# Patient Record
Sex: FEMALE | Race: WHITE | NOT HISPANIC OR LATINO
[De-identification: names, ages, dates, MRNs, and addresses within clinical notes are randomized per-mention and may not be internally consistent; named-entity substitution may affect disease eponyms.]

---

## 2017-03-20 ENCOUNTER — LABORATORY RESULT (OUTPATIENT)
Age: 62
End: 2017-03-20

## 2017-03-20 ENCOUNTER — APPOINTMENT (OUTPATIENT)
Dept: ENDOCRINOLOGY | Facility: CLINIC | Age: 62
End: 2017-03-20

## 2017-03-20 VITALS
OXYGEN SATURATION: 98 % | BODY MASS INDEX: 27 KG/M2 | WEIGHT: 164 LBS | HEART RATE: 64 BPM | HEIGHT: 65.5 IN | SYSTOLIC BLOOD PRESSURE: 120 MMHG | DIASTOLIC BLOOD PRESSURE: 80 MMHG

## 2017-03-21 LAB
25(OH)D3 SERPL-MCNC: 17.1 NG/ML
ALBUMIN SERPL ELPH-MCNC: 4.3 G/DL
ALP BLD-CCNC: 58 U/L
ALT SERPL-CCNC: 13 U/L
ANION GAP SERPL CALC-SCNC: 16 MMOL/L
AST SERPL-CCNC: 24 U/L
BILIRUB SERPL-MCNC: 0.2 MG/DL
BUN SERPL-MCNC: 30 MG/DL
CALCIUM SERPL-MCNC: 9.1 MG/DL
CALCIUM SERPL-MCNC: 9.1 MG/DL
CHLORIDE SERPL-SCNC: 107 MMOL/L
CO2 SERPL-SCNC: 22 MMOL/L
CREAT SERPL-MCNC: 0.68 MG/DL
GLUCOSE SERPL-MCNC: 79 MG/DL
PARATHYROID HORMONE INTACT: 24 PG/ML
POTASSIUM SERPL-SCNC: 4.4 MMOL/L
PROT SERPL-MCNC: 7 G/DL
SODIUM SERPL-SCNC: 145 MMOL/L
T3RU NFR SERPL: 1.14 INDEX
T4 SERPL-MCNC: 5 UG/DL
TSH SERPL-ACNC: 1.53 UIU/ML

## 2017-06-25 ENCOUNTER — EMERGENCY (EMERGENCY)
Facility: HOSPITAL | Age: 62
LOS: 1 days | Discharge: HOME | End: 2017-06-25
Admitting: EMERGENCY MEDICINE
Payer: COMMERCIAL

## 2017-06-25 PROCEDURE — 73030 X-RAY EXAM OF SHOULDER: CPT | Mod: 26,50

## 2017-06-25 PROCEDURE — 73200 CT UPPER EXTREMITY W/O DYE: CPT | Mod: 26,RT

## 2017-06-25 PROCEDURE — 99285 EMERGENCY DEPT VISIT HI MDM: CPT

## 2017-06-26 ENCOUNTER — OUTPATIENT (OUTPATIENT)
Dept: OUTPATIENT SERVICES | Facility: HOSPITAL | Age: 62
LOS: 1 days | End: 2017-06-26
Payer: COMMERCIAL

## 2017-06-26 ENCOUNTER — APPOINTMENT (OUTPATIENT)
Dept: CT IMAGING | Facility: CLINIC | Age: 62
End: 2017-06-26

## 2017-06-26 DIAGNOSIS — Z00.8 ENCOUNTER FOR OTHER GENERAL EXAMINATION: ICD-10-CM

## 2017-06-26 PROCEDURE — 96374 THER/PROPH/DIAG INJ IV PUSH: CPT

## 2017-06-26 PROCEDURE — 73200 CT UPPER EXTREMITY W/O DYE: CPT

## 2017-06-26 PROCEDURE — 76376 3D RENDER W/INTRP POSTPROCES: CPT

## 2017-06-26 PROCEDURE — 99284 EMERGENCY DEPT VISIT MOD MDM: CPT | Mod: 25

## 2017-06-26 PROCEDURE — 73030 X-RAY EXAM OF SHOULDER: CPT

## 2017-06-26 PROCEDURE — 96375 TX/PRO/DX INJ NEW DRUG ADDON: CPT

## 2017-06-29 ENCOUNTER — OUTPATIENT (OUTPATIENT)
Dept: OUTPATIENT SERVICES | Facility: HOSPITAL | Age: 62
LOS: 1 days | End: 2017-06-29
Payer: COMMERCIAL

## 2017-06-29 VITALS
OXYGEN SATURATION: 97 % | HEIGHT: 66 IN | RESPIRATION RATE: 20 BRPM | WEIGHT: 167.99 LBS | DIASTOLIC BLOOD PRESSURE: 92 MMHG | SYSTOLIC BLOOD PRESSURE: 150 MMHG | TEMPERATURE: 100 F | HEART RATE: 98 BPM

## 2017-06-29 DIAGNOSIS — I10 ESSENTIAL (PRIMARY) HYPERTENSION: ICD-10-CM

## 2017-06-29 DIAGNOSIS — S42.232A 3-PART FRACTURE OF SURGICAL NECK OF LEFT HUMERUS, INITIAL ENCOUNTER FOR CLOSED FRACTURE: ICD-10-CM

## 2017-06-29 DIAGNOSIS — S42.224A 2-PART NONDISPLACED FRACTURE OF SURGICAL NECK OF RIGHT HUMERUS, INITIAL ENCOUNTER FOR CLOSED FRACTURE: ICD-10-CM

## 2017-06-29 DIAGNOSIS — Z98.890 OTHER SPECIFIED POSTPROCEDURAL STATES: Chronic | ICD-10-CM

## 2017-06-29 DIAGNOSIS — Z01.818 ENCOUNTER FOR OTHER PREPROCEDURAL EXAMINATION: ICD-10-CM

## 2017-06-29 DIAGNOSIS — I49.9 CARDIAC ARRHYTHMIA, UNSPECIFIED: ICD-10-CM

## 2017-06-29 DIAGNOSIS — F41.9 ANXIETY DISORDER, UNSPECIFIED: ICD-10-CM

## 2017-06-29 DIAGNOSIS — Z91.09 OTHER ALLERGY STATUS, OTHER THAN TO DRUGS AND BIOLOGICAL SUBSTANCES: ICD-10-CM

## 2017-06-29 LAB
BLD GP AB SCN SERPL QL: NEGATIVE — SIGNIFICANT CHANGE UP
RH IG SCN BLD-IMP: POSITIVE — SIGNIFICANT CHANGE UP

## 2017-06-29 PROCEDURE — G0463: CPT

## 2017-06-29 PROCEDURE — 80048 BASIC METABOLIC PNL TOTAL CA: CPT

## 2017-06-29 PROCEDURE — 85027 COMPLETE CBC AUTOMATED: CPT

## 2017-06-29 PROCEDURE — 86901 BLOOD TYPING SEROLOGIC RH(D): CPT

## 2017-06-29 PROCEDURE — 86900 BLOOD TYPING SEROLOGIC ABO: CPT

## 2017-06-29 PROCEDURE — 86850 RBC ANTIBODY SCREEN: CPT

## 2017-06-29 RX ORDER — CEFAZOLIN SODIUM 1 G
2000 VIAL (EA) INJECTION ONCE
Qty: 0 | Refills: 0 | Status: DISCONTINUED | OUTPATIENT
Start: 2017-06-30 | End: 2017-07-02

## 2017-06-29 NOTE — H&P PST ADULT - HISTORY OF PRESENT ILLNESS
57 year old female with Mitral Valve Prolapse, HTN, Asthma, Migraine, Thyroid disorder. Pt had a mechanical fall on 6/25/2017 sustained B/L neck of humerus fracture s/p Er visit  -applied b/ splint .Now coming in PST for scheduled  Open Reduction Internal Fixation Of Left Proximal Humerus Surgical neck fracture .Examination Under Anesthesia of Right shoulder Possible Open Reduction Internal fixation of right Proximal Humerus  Surgical neck fracture on 6/30/2017. 57 year old female with Mitral Valve Prolapse, HTN, Asthma, Migraine, Thyroid disorder. Pt had a mechanical fall on 6/25/2017 sustained B/L neck of humerus fracture s/p Er visit  -applied b/ splint .Now coming in PST for scheduled  Open Reduction Internal Fixation Of Left Proximal Humerus Surgical neck fracture .Examination Under Anesthesia of Right shoulder Possible Open Reduction Internal fixation of right Proximal Humerus  Surgical neck fracture on 6/30/2017.  Pt has h/o  allergy reaction with metal as per patient Dr Mcdonald aware. 57 year old female with Mitral Valve Prolapse, HTN, Asthma, Migraine, Thyroid disorder. Pt had a mechanical fall on 6/25/2017 sustained B/L neck of humerus fracture s/p Er visit  -applied b/ splint .Now coming in PST for scheduled  Open Reduction Internal Fixation Of Left Proximal Humerus Surgical neck fracture .Examination Under Anesthesia of Right shoulder Possible Open Reduction Internal fixation of right Proximal Humerus  Surgical neck fracture on 6/30/2017.pt is very uncomfortable secondary to pain.  Pt has h/o  allergy reaction with metal as per patient Dr Mcdonald aware. 57 year old female with Mitral Valve Prolapse, HTN, Asthma, Migraine, Thyroid disorder. Pt had a mechanical fall on 6/25/2017 sustained B/L neck of humerus fracture s/p Er visit  -applied b/ splint .Now coming in PST for scheduled  Open Reduction Internal Fixation Of Left Proximal Humerus Surgical neck fracture .Examination Under Anesthesia of Right shoulder Possible Open Reduction Internal fixation of right Proximal Humerus  Surgical neck fracture on 6/30/2017.pt is very uncomfortable secondary to pain.  Pt has h/o  allergy reaction with metal as per patient Dr Tamara gonzalez, cardiology eval on file. 57 year old female with Mitral Valve Prolapse, HTN, Asthma, Migraine, Thyroid disorder. Pt had a mechanical fall on 6/25/2017 sustained B/L neck of humerus fracture s/p Er visit -  applied b/L ar sling sling .Now coming in PST for scheduled  Open Reduction Internal Fixation Of Left Proximal Humerus Surgical neck fracture .Examination Under Anesthesia of Right shoulder Possible Open Reduction Internal fixation of right Proximal Humerus  Surgical neck fracture on 6/30/2017.pt is very uncomfortable secondary to pain.  Pt has h/o  allergy reaction with metal as per patient Dr Tamara gonzalez, cardiology eval on file.

## 2017-06-29 NOTE — H&P PST ADULT - PRO PAIN LIFE ADAPT
inability to concentrate/inability or reluctance to perform ADLs/inability to sleep/decreased activity level

## 2017-06-29 NOTE — H&P PST ADULT - NSANTHOSAYNRD_GEN_A_CORE
No. JULIA screening performed.  STOP BANG Legend: 0-2 = LOW Risk; 3-4 = INTERMEDIATE Risk; 5-8 = HIGH Risk

## 2017-06-29 NOTE — H&P PST ADULT - PROBLEM SELECTOR PLAN 2
.Examination Under Anesthesia of Right shoulder Possible Open Reduction Internal fixation of right Proximal Humerus  Surgical neck fracture on 6/30/2017.

## 2017-06-29 NOTE — H&P PST ADULT - PSH
H/O arthroscopic knee surgery  right  (1993)  History of D&C    S/P laparoscopy  endometriosis  S/P tonsillectomy and adenoidectomy  1961

## 2017-06-29 NOTE — H&P PST ADULT - RS GEN PE MLT RESP DETAILS PC
breath sounds equal/respirations non-labored/normal/good air movement/no chest wall tenderness/airway patent/clear to auscultation bilaterally

## 2017-06-29 NOTE — H&P PST ADULT - PROBLEM SELECTOR PROBLEM 2
2-part nondisplaced fracture of surgical neck of right humerus, initial encounter for closed fracture

## 2017-06-29 NOTE — H&P PST ADULT - PROBLEM SELECTOR PLAN 1
Open Reduction Internal Fixation Of Left Proximal Humerus Surgical neck fracture  Pre Op instructions discussed Open Reduction Internal Fixation Of Left Proximal Humerus Surgical neck fracture  Pre Op instructions discussed  Labs CBC,BMP Type and screen sent

## 2017-06-29 NOTE — H&P PST ADULT - LYMPHATIC
supraclavicular L/anterior cervical L/supraclavicular R/anterior cervical R/posterior cervical L/posterior cervical R

## 2017-06-30 ENCOUNTER — INPATIENT (INPATIENT)
Facility: HOSPITAL | Age: 62
LOS: 1 days | Discharge: HOME | DRG: 493 | End: 2017-07-02
Attending: ORTHOPAEDIC SURGERY | Admitting: ORTHOPAEDIC SURGERY
Payer: COMMERCIAL

## 2017-06-30 VITALS
OXYGEN SATURATION: 97 % | DIASTOLIC BLOOD PRESSURE: 90 MMHG | TEMPERATURE: 98 F | RESPIRATION RATE: 210 BRPM | SYSTOLIC BLOOD PRESSURE: 162 MMHG | HEART RATE: 92 BPM

## 2017-06-30 DIAGNOSIS — S42.232A 3-PART FRACTURE OF SURGICAL NECK OF LEFT HUMERUS, INITIAL ENCOUNTER FOR CLOSED FRACTURE: ICD-10-CM

## 2017-06-30 DIAGNOSIS — S42.224A 2-PART NONDISPLACED FRACTURE OF SURGICAL NECK OF RIGHT HUMERUS, INITIAL ENCOUNTER FOR CLOSED FRACTURE: ICD-10-CM

## 2017-06-30 DIAGNOSIS — Z98.890 OTHER SPECIFIED POSTPROCEDURAL STATES: Chronic | ICD-10-CM

## 2017-06-30 DIAGNOSIS — Z01.818 ENCOUNTER FOR OTHER PREPROCEDURAL EXAMINATION: ICD-10-CM

## 2017-06-30 LAB
ANION GAP SERPL CALC-SCNC: 18 MMOL/L — HIGH (ref 5–17)
BUN SERPL-MCNC: 18 MG/DL — SIGNIFICANT CHANGE UP (ref 7–23)
CALCIUM SERPL-MCNC: 9.5 MG/DL — SIGNIFICANT CHANGE UP (ref 8.4–10.5)
CHLORIDE SERPL-SCNC: 105 MMOL/L — SIGNIFICANT CHANGE UP (ref 96–108)
CO2 SERPL-SCNC: 19 MMOL/L — LOW (ref 22–31)
CREAT SERPL-MCNC: 0.67 MG/DL — SIGNIFICANT CHANGE UP (ref 0.5–1.3)
GLUCOSE SERPL-MCNC: 107 MG/DL — HIGH (ref 70–99)
HCT VFR BLD CALC: 31.5 % — LOW (ref 34.5–45)
HGB BLD-MCNC: 9.7 G/DL — LOW (ref 11.5–15.5)
MCHC RBC-ENTMCNC: 26.6 PG — LOW (ref 27–34)
MCHC RBC-ENTMCNC: 30.8 GM/DL — LOW (ref 32–36)
MCV RBC AUTO: 86.5 FL — SIGNIFICANT CHANGE UP (ref 80–100)
PLATELET # BLD AUTO: 347 K/UL — SIGNIFICANT CHANGE UP (ref 150–400)
POTASSIUM SERPL-MCNC: 4.5 MMOL/L — SIGNIFICANT CHANGE UP (ref 3.5–5.3)
POTASSIUM SERPL-SCNC: 4.5 MMOL/L — SIGNIFICANT CHANGE UP (ref 3.5–5.3)
RBC # BLD: 3.64 M/UL — LOW (ref 3.8–5.2)
RBC # FLD: 14.6 % — HIGH (ref 10.3–14.5)
RH IG SCN BLD-IMP: POSITIVE — SIGNIFICANT CHANGE UP
SODIUM SERPL-SCNC: 142 MMOL/L — SIGNIFICANT CHANGE UP (ref 135–145)
WBC # BLD: 11.7 K/UL — HIGH (ref 3.8–10.5)
WBC # FLD AUTO: 11.7 K/UL — HIGH (ref 3.8–10.5)

## 2017-06-30 PROCEDURE — 73030 X-RAY EXAM OF SHOULDER: CPT | Mod: 26,LT

## 2017-06-30 RX ORDER — LIDOCAINE HCL 20 MG/ML
0.2 VIAL (ML) INJECTION ONCE
Qty: 0 | Refills: 0 | Status: DISCONTINUED | OUTPATIENT
Start: 2017-06-30 | End: 2017-06-30

## 2017-06-30 RX ORDER — TOPIRAMATE 25 MG
1 TABLET ORAL
Qty: 0 | Refills: 0 | COMMUNITY

## 2017-06-30 RX ORDER — ESCITALOPRAM OXALATE 10 MG/1
1 TABLET, FILM COATED ORAL
Qty: 0 | Refills: 0 | COMMUNITY

## 2017-06-30 RX ORDER — TOPIRAMATE 25 MG
3 TABLET ORAL
Qty: 0 | Refills: 0 | COMMUNITY

## 2017-06-30 RX ORDER — METOPROLOL TARTRATE 50 MG
1 TABLET ORAL
Qty: 0 | Refills: 0 | COMMUNITY

## 2017-06-30 RX ORDER — ACETAMINOPHEN 500 MG
650 TABLET ORAL EVERY 6 HOURS
Qty: 0 | Refills: 0 | Status: DISCONTINUED | OUTPATIENT
Start: 2017-06-30 | End: 2017-07-02

## 2017-06-30 RX ORDER — LOSARTAN POTASSIUM 100 MG/1
50 TABLET, FILM COATED ORAL DAILY
Qty: 0 | Refills: 0 | Status: DISCONTINUED | OUTPATIENT
Start: 2017-06-30 | End: 2017-07-02

## 2017-06-30 RX ORDER — LORATADINE 10 MG/1
1 TABLET ORAL
Qty: 0 | Refills: 0 | COMMUNITY

## 2017-06-30 RX ORDER — TOPIRAMATE 25 MG
50 TABLET ORAL AT BEDTIME
Qty: 0 | Refills: 0 | Status: DISCONTINUED | OUTPATIENT
Start: 2017-06-30 | End: 2017-07-02

## 2017-06-30 RX ORDER — ONDANSETRON 8 MG/1
4 TABLET, FILM COATED ORAL EVERY 6 HOURS
Qty: 0 | Refills: 0 | Status: DISCONTINUED | OUTPATIENT
Start: 2017-06-30 | End: 2017-07-02

## 2017-06-30 RX ORDER — FAMOTIDINE 10 MG/ML
20 INJECTION INTRAVENOUS EVERY 12 HOURS
Qty: 0 | Refills: 0 | Status: DISCONTINUED | OUTPATIENT
Start: 2017-06-30 | End: 2017-07-02

## 2017-06-30 RX ORDER — LORATADINE 10 MG/1
10 TABLET ORAL DAILY
Qty: 0 | Refills: 0 | Status: DISCONTINUED | OUTPATIENT
Start: 2017-06-30 | End: 2017-07-02

## 2017-06-30 RX ORDER — HYDROMORPHONE HYDROCHLORIDE 2 MG/ML
0.5 INJECTION INTRAMUSCULAR; INTRAVENOUS; SUBCUTANEOUS
Qty: 0 | Refills: 0 | Status: DISCONTINUED | OUTPATIENT
Start: 2017-06-30 | End: 2017-06-30

## 2017-06-30 RX ORDER — DIPHENHYDRAMINE HCL 50 MG
25 CAPSULE ORAL AT BEDTIME
Qty: 0 | Refills: 0 | Status: DISCONTINUED | OUTPATIENT
Start: 2017-06-30 | End: 2017-07-02

## 2017-06-30 RX ORDER — ESCITALOPRAM OXALATE 10 MG/1
10 TABLET, FILM COATED ORAL DAILY
Qty: 0 | Refills: 0 | Status: DISCONTINUED | OUTPATIENT
Start: 2017-06-30 | End: 2017-07-02

## 2017-06-30 RX ORDER — OXYCODONE HYDROCHLORIDE 5 MG/1
10 TABLET ORAL EVERY 4 HOURS
Qty: 0 | Refills: 0 | Status: DISCONTINUED | OUTPATIENT
Start: 2017-06-30 | End: 2017-07-02

## 2017-06-30 RX ORDER — HEPARIN SODIUM 5000 [USP'U]/ML
5000 INJECTION INTRAVENOUS; SUBCUTANEOUS EVERY 8 HOURS
Qty: 0 | Refills: 0 | Status: DISCONTINUED | OUTPATIENT
Start: 2017-06-30 | End: 2017-07-02

## 2017-06-30 RX ORDER — SODIUM CHLORIDE 9 MG/ML
1000 INJECTION, SOLUTION INTRAVENOUS
Qty: 0 | Refills: 0 | Status: DISCONTINUED | OUTPATIENT
Start: 2017-06-30 | End: 2017-07-02

## 2017-06-30 RX ORDER — OXYCODONE HYDROCHLORIDE 5 MG/1
5 TABLET ORAL EVERY 4 HOURS
Qty: 0 | Refills: 0 | Status: DISCONTINUED | OUTPATIENT
Start: 2017-06-30 | End: 2017-07-02

## 2017-06-30 RX ORDER — LOSARTAN POTASSIUM 100 MG/1
1 TABLET, FILM COATED ORAL
Qty: 0 | Refills: 0 | COMMUNITY

## 2017-06-30 RX ORDER — CEFAZOLIN SODIUM 1 G
2000 VIAL (EA) INJECTION EVERY 8 HOURS
Qty: 0 | Refills: 0 | Status: COMPLETED | OUTPATIENT
Start: 2017-06-30 | End: 2017-07-01

## 2017-06-30 RX ORDER — ALBUTEROL 90 UG/1
2 AEROSOL, METERED ORAL EVERY 6 HOURS
Qty: 0 | Refills: 0 | Status: DISCONTINUED | OUTPATIENT
Start: 2017-06-30 | End: 2017-07-02

## 2017-06-30 RX ORDER — ASCORBIC ACID 60 MG
500 TABLET,CHEWABLE ORAL
Qty: 0 | Refills: 0 | Status: DISCONTINUED | OUTPATIENT
Start: 2017-06-30 | End: 2017-07-02

## 2017-06-30 RX ORDER — DOCUSATE SODIUM 100 MG
100 CAPSULE ORAL THREE TIMES A DAY
Qty: 0 | Refills: 0 | Status: DISCONTINUED | OUTPATIENT
Start: 2017-06-30 | End: 2017-07-02

## 2017-06-30 RX ORDER — MORPHINE SULFATE 50 MG/1
2 CAPSULE, EXTENDED RELEASE ORAL EVERY 4 HOURS
Qty: 0 | Refills: 0 | Status: DISCONTINUED | OUTPATIENT
Start: 2017-06-30 | End: 2017-07-02

## 2017-06-30 RX ORDER — ALBUTEROL 90 UG/1
2 AEROSOL, METERED ORAL
Qty: 0 | Refills: 0 | COMMUNITY

## 2017-06-30 RX ORDER — SODIUM CHLORIDE 9 MG/ML
3 INJECTION INTRAMUSCULAR; INTRAVENOUS; SUBCUTANEOUS EVERY 8 HOURS
Qty: 0 | Refills: 0 | Status: DISCONTINUED | OUTPATIENT
Start: 2017-06-30 | End: 2017-06-30

## 2017-06-30 RX ORDER — TOPIRAMATE 25 MG
75 TABLET ORAL DAILY
Qty: 0 | Refills: 0 | Status: DISCONTINUED | OUTPATIENT
Start: 2017-06-30 | End: 2017-07-02

## 2017-06-30 RX ORDER — METOPROLOL TARTRATE 50 MG
25 TABLET ORAL DAILY
Qty: 0 | Refills: 0 | Status: DISCONTINUED | OUTPATIENT
Start: 2017-06-30 | End: 2017-07-02

## 2017-06-30 RX ADMIN — Medication 650 MILLIGRAM(S): at 16:22

## 2017-06-30 RX ADMIN — MORPHINE SULFATE 2 MILLIGRAM(S): 50 CAPSULE, EXTENDED RELEASE ORAL at 23:30

## 2017-06-30 RX ADMIN — Medication 650 MILLIGRAM(S): at 16:52

## 2017-06-30 RX ADMIN — Medication 100 MILLIGRAM(S): at 20:32

## 2017-06-30 RX ADMIN — Medication 100 MILLIGRAM(S): at 17:10

## 2017-06-30 RX ADMIN — SODIUM CHLORIDE 3 MILLILITER(S): 9 INJECTION INTRAMUSCULAR; INTRAVENOUS; SUBCUTANEOUS at 06:57

## 2017-06-30 RX ADMIN — FAMOTIDINE 20 MILLIGRAM(S): 10 INJECTION INTRAVENOUS at 17:16

## 2017-06-30 RX ADMIN — HEPARIN SODIUM 5000 UNIT(S): 5000 INJECTION INTRAVENOUS; SUBCUTANEOUS at 22:11

## 2017-06-30 RX ADMIN — OXYCODONE HYDROCHLORIDE 5 MILLIGRAM(S): 5 TABLET ORAL at 16:22

## 2017-06-30 RX ADMIN — Medication 25 MILLIGRAM(S): at 22:13

## 2017-06-30 RX ADMIN — Medication 100 MILLIGRAM(S): at 22:11

## 2017-06-30 RX ADMIN — OXYCODONE HYDROCHLORIDE 10 MILLIGRAM(S): 5 TABLET ORAL at 20:36

## 2017-06-30 RX ADMIN — SODIUM CHLORIDE 75 MILLILITER(S): 9 INJECTION, SOLUTION INTRAVENOUS at 15:46

## 2017-06-30 RX ADMIN — Medication 500 MILLIGRAM(S): at 17:16

## 2017-06-30 RX ADMIN — Medication 50 MILLIGRAM(S): at 22:11

## 2017-06-30 RX ADMIN — OXYCODONE HYDROCHLORIDE 5 MILLIGRAM(S): 5 TABLET ORAL at 16:52

## 2017-06-30 RX ADMIN — OXYCODONE HYDROCHLORIDE 10 MILLIGRAM(S): 5 TABLET ORAL at 21:30

## 2017-06-30 NOTE — CHART NOTE - NSCHARTNOTEFT_GEN_A_CORE
Resting without complaints.  No Chest Pain, SOB, N/V.    T(C): 36.9 (06-30-17 @ 13:15), Max: 37.7 (06-29-17 @ 16:38)  HR: 101 (06-30-17 @ 14:30) (92 - 104)  BP: 116/58 (06-30-17 @ 14:30) (108/57 - 162/90)  RR: 18 (06-30-17 @ 14:30) (18 - 210)  SpO2: 96% (06-30-17 @ 14:30) (96% - 100%)  Wt(kg): --    Exam:  Alert and [place], No Acute Distress  Card: +S1/S2, RRR  Pulm: CTAB  Bilateral Upper Extremities in Slings.  Compartments soft/compressible.  + Radial Pulses bilat  LUE dsg c/d/i.  Limited exam 2/2 block but digits warm and slightly mobile, unable to assess sensation  RUE digits pink/warm/mobile with good wrist mobility. SILT  Calves S/NT bilat.               AM Labs      A/P: S/p ORIF Left Prox humerus fracture with non-op treatment of R proximal humerus fracture.  S/p interscalene block.  VSS. NAD  -re-eval when block d/c'd.   - AM labs  -PT/OT- NWB bilat upper extremities, may remove sling LUE, sling at all times RUE, no ROM of shoulders.  -IS  -DVT PPx  -Pain Control  -Continue Current Tx

## 2017-07-01 PROCEDURE — 93010 ELECTROCARDIOGRAM REPORT: CPT

## 2017-07-01 RX ORDER — DOCUSATE SODIUM 100 MG
1 CAPSULE ORAL
Qty: 0 | Refills: 0 | COMMUNITY
Start: 2017-07-01

## 2017-07-01 RX ORDER — FAMOTIDINE 10 MG/ML
1 INJECTION INTRAVENOUS
Qty: 0 | Refills: 0 | COMMUNITY
Start: 2017-07-01

## 2017-07-01 RX ORDER — ACETAMINOPHEN 500 MG
2 TABLET ORAL
Qty: 0 | Refills: 0 | COMMUNITY
Start: 2017-07-01

## 2017-07-01 RX ORDER — ASCORBIC ACID 60 MG
1 TABLET,CHEWABLE ORAL
Qty: 0 | Refills: 0 | COMMUNITY
Start: 2017-07-01

## 2017-07-01 RX ORDER — OXYCODONE HYDROCHLORIDE 5 MG/1
1 TABLET ORAL
Qty: 0 | Refills: 0 | COMMUNITY
Start: 2017-07-01

## 2017-07-01 RX ORDER — ACETAMINOPHEN 500 MG
1000 TABLET ORAL ONCE
Qty: 0 | Refills: 0 | Status: COMPLETED | OUTPATIENT
Start: 2017-07-01 | End: 2017-07-01

## 2017-07-01 RX ADMIN — Medication 100 MILLIGRAM(S): at 06:25

## 2017-07-01 RX ADMIN — ESCITALOPRAM OXALATE 10 MILLIGRAM(S): 10 TABLET, FILM COATED ORAL at 11:35

## 2017-07-01 RX ADMIN — Medication 400 MILLIGRAM(S): at 00:57

## 2017-07-01 RX ADMIN — Medication 500 MILLIGRAM(S): at 06:25

## 2017-07-01 RX ADMIN — OXYCODONE HYDROCHLORIDE 10 MILLIGRAM(S): 5 TABLET ORAL at 00:53

## 2017-07-01 RX ADMIN — OXYCODONE HYDROCHLORIDE 10 MILLIGRAM(S): 5 TABLET ORAL at 07:12

## 2017-07-01 RX ADMIN — HEPARIN SODIUM 5000 UNIT(S): 5000 INJECTION INTRAVENOUS; SUBCUTANEOUS at 06:25

## 2017-07-01 RX ADMIN — Medication 1 TABLET(S): at 11:39

## 2017-07-01 RX ADMIN — LOSARTAN POTASSIUM 50 MILLIGRAM(S): 100 TABLET, FILM COATED ORAL at 06:25

## 2017-07-01 RX ADMIN — Medication 650 MILLIGRAM(S): at 20:56

## 2017-07-01 RX ADMIN — FAMOTIDINE 20 MILLIGRAM(S): 10 INJECTION INTRAVENOUS at 17:28

## 2017-07-01 RX ADMIN — Medication 650 MILLIGRAM(S): at 11:44

## 2017-07-01 RX ADMIN — HEPARIN SODIUM 5000 UNIT(S): 5000 INJECTION INTRAVENOUS; SUBCUTANEOUS at 13:12

## 2017-07-01 RX ADMIN — OXYCODONE HYDROCHLORIDE 10 MILLIGRAM(S): 5 TABLET ORAL at 02:00

## 2017-07-01 RX ADMIN — FAMOTIDINE 20 MILLIGRAM(S): 10 INJECTION INTRAVENOUS at 06:25

## 2017-07-01 RX ADMIN — Medication 650 MILLIGRAM(S): at 21:56

## 2017-07-01 RX ADMIN — HEPARIN SODIUM 5000 UNIT(S): 5000 INJECTION INTRAVENOUS; SUBCUTANEOUS at 21:03

## 2017-07-01 RX ADMIN — Medication 650 MILLIGRAM(S): at 12:06

## 2017-07-01 RX ADMIN — Medication 25 MILLIGRAM(S): at 21:03

## 2017-07-01 RX ADMIN — Medication 500 MILLIGRAM(S): at 17:28

## 2017-07-01 RX ADMIN — Medication 100 MILLIGRAM(S): at 13:12

## 2017-07-01 RX ADMIN — Medication 100 MILLIGRAM(S): at 21:02

## 2017-07-01 RX ADMIN — MORPHINE SULFATE 2 MILLIGRAM(S): 50 CAPSULE, EXTENDED RELEASE ORAL at 00:42

## 2017-07-01 RX ADMIN — OXYCODONE HYDROCHLORIDE 10 MILLIGRAM(S): 5 TABLET ORAL at 05:18

## 2017-07-01 RX ADMIN — Medication 100 MILLIGRAM(S): at 06:24

## 2017-07-01 RX ADMIN — Medication 75 MILLIGRAM(S): at 11:39

## 2017-07-01 RX ADMIN — Medication 50 MILLIGRAM(S): at 21:02

## 2017-07-01 NOTE — PHYSICAL THERAPY INITIAL EVALUATION ADULT - PERTINENT HX OF CURRENT PROBLEM, REHAB EVAL
Pt is a 57 year old female admitted to [facility] on 6/30/17 s/p mechanical fall 6/25/17 sustained b/l neck of humerus fx. Went to ED, b/l sling UE, Now scheduled for PST ORIF . Pt with hx of allergy with metal. Pt with hx MVP, HTN, asthma, migraine, thyroid disorder

## 2017-07-01 NOTE — DISCHARGE NOTE ADULT - CARE PROVIDER_API CALL
Ovi Mcdonald (MD), Orthopaedic Surgery  [address] [place] [address]  Phone: [phone]  Fax: [phone]

## 2017-07-01 NOTE — PHYSICAL THERAPY INITIAL EVALUATION ADULT - DISCHARGE DISPOSITION, PT EVAL
outpatient PT/DC home with outpatient PT services per MD discretion, assist from family for mobility/ADLs, CM to be notified, DC plan to be emailed.

## 2017-07-01 NOTE — PROGRESS NOTE ADULT - ASSESSMENT
S/P  ORIF LT PROX HUMERUS FX          Non-op RT PROX HUMERUS FX    Plan:         OT/PT        CK LABS       NO ROM OF SHOULDERS       Meghan Pickering PA-C   Beeper    [id number]

## 2017-07-01 NOTE — DISCHARGE NOTE ADULT - ADDITIONAL INSTRUCTIONS
- Follow up with Dr. Mcdonald in 10-14 days after surgery; call office for appointment upon discharge  - - Please have staples/sutures removed by physician 10-14 days after surgery if applicable  - - please follow up with your primary care doctor after discharge from hospital to discuss your hospital stay and any changes to you medications

## 2017-07-01 NOTE — DISCHARGE NOTE ADULT - PLAN OF CARE
non-op    DIET: resume regular diet regimen   DVT PROPHYLAXIS: ecotrin 325mg once a day x 6 weeks  WEIGHT-BEARING STATUS: - NON-weight bearing bilateral arms in slings  - NO range of motion of bilateral shoulders (range of motion at elbow/wrist/hand/fingers OK)  BATHING: keep dressing clean and dry   DRESSING CHANGES: every 2 days until dressings are dry open reduction internal fixation left humerus    DIET: resume regular diet regimen   DVT PROPHYLAXIS: ecotrin 325mg once a day x 6 weeks  WEIGHT-BEARING STATUS: - NON-weight bearing bilateral arms in slings  - NO range of motion of bilateral shoulders (range of motion at elbow/wrist/hand/fingers OK)  BATHING: keep dressing clean and dry   DRESSING CHANGES: every 2 days until dressings are dry pain free activities of daily living NON-OP    DIET: resume regular diet regimen   DVT PROPHYLAXIS: ecotrin 325mg once a day x 6 weeks  WEIGHT-BEARING STATUS: - NON-weight bearing bilateral arms in slings; both arms are OK to be taken out of slings for range of motion exercises  Right arm (NON-OP): NO shoulder range of motion; active range of motion at elbow/wrist/fingers/hand as tolerated OK  Left arm: active range of motion at shoulder/elbow/wrist/fingers/hand as tolerated OK    BATHING: keep dressing clean and dry   DRESSING CHANGES: every 2 days until dressings are dry open reduction internal fixation left humerus    DIET: resume regular diet regimen   DVT PROPHYLAXIS: ecotrin 325mg once a day x 6 weeks  WEIGHT-BEARING STATUS: - NON-weight bearing bilateral arms in slings; both arms are OK to be taken out of slings for range of motion exercises  Right arm (NON-OP): NO shoulder range of motion; active range of motion at elbow/wrist/fingers/hand as tolerated OK  Left arm: active range of motion at shoulder/elbow/wrist/fingers/hand as tolerated OK    BATHING: keep dressing clean and dry   DRESSING CHANGES: every 2 days until dressings are dry    DIET: resume regular diet regimen   DVT PROPHYLAXIS: ecotrin 325mg once a day x 6 weeks  WEIGHT-BEARING STATUS: - NON-weight bearing bilateral arms in slings  - NO range of motion of bilateral shoulders (range of motion at elbow/wrist/hand/fingers OK)  BATHING: keep dressing clean and dry   DRESSING CHANGES: every 2 days until dressings are dry

## 2017-07-01 NOTE — DISCHARGE NOTE ADULT - NS AS ACTIVITY OBS
Do not drive or operate machinery/Walking-Outdoors allowed/No Heavy lifting/straining/Stairs allowed/Walking-Indoors allowed/Do not make important decisions

## 2017-07-01 NOTE — PHYSICAL THERAPY INITIAL EVALUATION ADULT - ADDITIONAL COMMENTS
Pt lives at home with spouse, +3 steps to etner with landings ( 1 step landing, 1 step then landing, etc), PTA ind amb and ADLs; spouse with hx TBI, dtr is home and able to assist as needed (staying with parents for the week)

## 2017-07-01 NOTE — DISCHARGE NOTE ADULT - HOSPITAL COURSE
· Primary Care Provider	Dr Iam Suero   · Care Providers for Follow up (PCP/Outpatient Provider)	Randy Canada  cardiology    [phone]    Chief Complaint/Reason for Visit/HPI:   Chief Complaint/Reason for Visit:  Chief Complaint/Reason for Admission	ORIF left and right Humerus    History of Present Illness:  History of Present Illness	  57 year old female with Mitral Valve Prolapse, HTN, Asthma, Migraine, Thyroid disorder. Pt had a mechanical fall on 2017 sustained B/L neck of humerus fracture s/p Er visit -  applied b/L ar sling sling .Now coming in PST for scheduled  Open Reduction Internal Fixation Of Left Proximal Humerus Surgical neck fracture .Examination Under Anesthesia of Right shoulder Possible Open Reduction Internal fixation of right Proximal Humerus  Surgical neck fracture on 2017.pt is very uncomfortable secondary to pain.  Pt has h/o  allergy reaction with metal as per patient Dr Tamara gonzalez, cardiology eval on file.    Allergies/Medications:   Allergies:        Allergies:  	Altace: Drug, Short breath, wheeze  	sulfa drugs: Drug Category, Anaphylaxis  	fish: Food, Anaphylaxis  	bees: Miscellaneous, Anaphylaxis, bees  	metals: Miscellaneous, Rash, metals  	Effexor: Drug, Chills, Vomiting    Home Medications:   * Patient Currently Takes Medications as of 2017 17:02 documented in Prescription Writer  · 	Toprol-XL 25 mg oral tablet, extended release: Last Dose Taken:  , 1 tab(s) orally once a day (at bedtime)  · 	Cozaar 50 mg oral tablet: Last Dose Taken:  , 1 tab(s) orally once a day  · 	ProAir HFA 90 mcg/inh inhalation aerosol: Last Dose Taken:  , 2 puff(s) inhaled 4 times a day, As Needed  · 	Lexapro 10 mg oral tablet: Last Dose Taken:  , 1 tab(s) orally once a day  · 	Topamax 50 mg oral tablet: Last Dose Taken:  , 1 tab(s) orally once a day (at bedtime)  · 	Topamax 25 mg oral tablet: Last Dose Taken:  , 3 tab(s) orally once a day In am ( 75 Mg)  · 	oxycodone-acetaminophen 5mg-325mg oral tablet: Last Dose Taken:  , 1 tab(s) orally every 6 hours, As Needed  · 	Claritin 24 Hour Allergy 10 mg oral tablet: Last Dose Taken:  , 1 tab(s) orally once a day    PMH/PSH/FH/SH:   Past Medical History:  2-part nondisplaced fracture of surgical neck of right humerus, initial encounter for closed fracture  s/p mechanical fall  3-part fracture of surgical neck of left humerus, initial encounter    Anxiety    Arrhythmia  f/u with cardiology  Asthma  last attack  ( has never  been hospitalized, controlled )  Endometriosis  Dilatation and Curettage ( polyps removal )  Hemorrhage complicating pregnancy, greater than 22 weeks, antepartum  due to close child birth  Hemorrhoids    HTN (hypertension)    Migraine with vertigo    Mitral valve prolapse    Neck injury  C3-4  area in  ( fell down a flight of stairs at [facility] )   delivery   labour twice, uterine wall unable to contract ( massive blood loss).  Thyroid disorder  s/p radio active  tetratment ()  Vertigo  ( Severe ) where hospital for it in [facility] 2 years ago. Last year slight vertigo 2012. Was evaluated for Stroke,  Multiple Sclerosis, No Brain tumor ) and was cleared.    Past Surgical History:  H/O arthroscopic knee surgery  right  ()  History of D&C    S/P laparoscopy  endometriosis  S/P tonsillectomy and adenoidectomy  .    Hospital Course:  : Admitted to [facility]; with bilateral proximal humerus fractures; underwent left proximal humerus open reduction internal fixation; right humerus was non-op'ed; tolerated procedure well   : evaluated by physical therapy/occupational therapy who recommended: home  Pt stable for discharge on:  Discharge H/H: · Primary Care Provider	Dr Iam Suero   · Care Providers for Follow up (PCP/Outpatient Provider)	Randy Canada  cardiology    [phone]    Chief Complaint/Reason for Visit/HPI:   Chief Complaint/Reason for Visit:  Chief Complaint/Reason for Admission	ORIF left and right Humerus    History of Present Illness:  History of Present Illness	  57 year old female with Mitral Valve Prolapse, HTN, Asthma, Migraine, Thyroid disorder. Pt had a mechanical fall on 2017 sustained B/L neck of humerus fracture s/p Er visit -  applied b/L ar sling sling .Now coming in PST for scheduled  Open Reduction Internal Fixation Of Left Proximal Humerus Surgical neck fracture .Examination Under Anesthesia of Right shoulder Possible Open Reduction Internal fixation of right Proximal Humerus  Surgical neck fracture on 2017.pt is very uncomfortable secondary to pain.  Pt has h/o  allergy reaction with metal as per patient Dr Tamara gonzalez, cardiology eval on file.    Allergies/Medications:   Allergies:        Allergies:  	Altace: Drug, Short breath, wheeze  	sulfa drugs: Drug Category, Anaphylaxis  	fish: Food, Anaphylaxis  	bees: Miscellaneous, Anaphylaxis, bees  	metals: Miscellaneous, Rash, metals  	Effexor: Drug, Chills, Vomiting    Home Medications:   * Patient Currently Takes Medications as of 2017 17:02 documented in Prescription Writer  · 	Toprol-XL 25 mg oral tablet, extended release: Last Dose Taken:  , 1 tab(s) orally once a day (at bedtime)  · 	Cozaar 50 mg oral tablet: Last Dose Taken:  , 1 tab(s) orally once a day  · 	ProAir HFA 90 mcg/inh inhalation aerosol: Last Dose Taken:  , 2 puff(s) inhaled 4 times a day, As Needed  · 	Lexapro 10 mg oral tablet: Last Dose Taken:  , 1 tab(s) orally once a day  · 	Topamax 50 mg oral tablet: Last Dose Taken:  , 1 tab(s) orally once a day (at bedtime)  · 	Topamax 25 mg oral tablet: Last Dose Taken:  , 3 tab(s) orally once a day In am ( 75 Mg)  · 	oxycodone-acetaminophen 5mg-325mg oral tablet: Last Dose Taken:  , 1 tab(s) orally every 6 hours, As Needed  · 	Claritin 24 Hour Allergy 10 mg oral tablet: Last Dose Taken:  , 1 tab(s) orally once a day    PMH/PSH/FH/SH:   Past Medical History:  2-part nondisplaced fracture of surgical neck of right humerus, initial encounter for closed fracture  s/p mechanical fall  3-part fracture of surgical neck of left humerus, initial encounter    Anxiety    Arrhythmia  f/u with cardiology  Asthma  last attack  ( has never  been hospitalized, controlled )  Endometriosis  Dilatation and Curettage ( polyps removal )  Hemorrhage complicating pregnancy, greater than 22 weeks, antepartum  due to close child birth  Hemorrhoids    HTN (hypertension)    Migraine with vertigo    Mitral valve prolapse    Neck injury  C3-4  area in  ( fell down a flight of stairs at [facility] )   delivery   labour twice, uterine wall unable to contract ( massive blood loss).  Thyroid disorder  s/p radio active  tetratment ()  Vertigo  ( Severe ) where hospital for it in [facility] 2 years ago. Last year slight vertigo 2012. Was evaluated for Stroke,  Multiple Sclerosis, No Brain tumor ) and was cleared.    Past Surgical History:  H/O arthroscopic knee surgery  right  ()  History of D&C    S/P laparoscopy  endometriosis  S/P tonsillectomy and adenoidectomy  .    Hospital Course:  : Admitted to [facility]; with bilateral proximal humerus fractures; underwent left proximal humerus open reduction internal fixation; right humerus was non-op'ed; tolerated procedure well   : evaluated by physical therapy/occupational therapy who recommended: home  Pt stable for discharge on: 17  Discharge H/H:

## 2017-07-01 NOTE — OCCUPATIONAL THERAPY INITIAL EVALUATION ADULT - DIAGNOSIS, OT EVAL
Pt presents with decreased ADL/IADL performance, range of motion which affects ADL/IADL performance.

## 2017-07-01 NOTE — PHYSICAL THERAPY INITIAL EVALUATION ADULT - DID THE PATIENT HAVE SURGERY?
ORIF Left Proximal Humerus.  Examination under anesthesia Left Proximal humerus fracture, Non-op R proximal humerus fx/yes

## 2017-07-01 NOTE — DISCHARGE NOTE ADULT - MEDICATION SUMMARY - MEDICATIONS TO TAKE
I will START or STAY ON the medications listed below when I get home from the hospital:    acetaminophen 325 mg oral tablet  -- 2 tab(s) by mouth every 6 hours, As needed, For Temp greater than 38.5 C (101.3 F)  -- Indication: For fever    acetaminophen 325 mg oral tablet  -- 2 tab(s) by mouth every 6 hours, As needed, Mild Pain (1 - 3)  -- Indication: For pain    oxyCODONE 10 mg oral tablet  -- 1 tab(s) by mouth every 4 hours, As needed, Severe Pain (7 - 10)  -- Indication: For pain    Ecotrin 325 mg oral delayed release tablet  -- 1 tab(s) by mouth once a day  -- Indication: For dvt ppx    oxyCODONE 5 mg oral tablet  -- 1-2 tab(s) by mouth every 4 hours, As needed, Moderate Pain (4 - 6) MDD:8  -- Indication: For pain    Cozaar 50 mg oral tablet  -- 1 tab(s) by mouth once a day  -- Indication: For Hypertension    Topamax 50 mg oral tablet  -- 1 tab(s) by mouth once a day (at bedtime)  -- Indication: For anticonvulsant    Topamax 25 mg oral tablet  -- 3 tab(s) by mouth once a day In am ( 75 Mg)  -- Indication: For anticonvulsant    Lexapro 10 mg oral tablet  -- 1 tab(s) by mouth once a day  -- Indication: For depression    Claritin 24 Hour Allergy 10 mg oral tablet  -- 1 tab(s) by mouth once a day  -- Indication: For Seasonal allergies    Toprol-XL 25 mg oral tablet, extended release  -- 1 tab(s) by mouth once a day (at bedtime)  -- Indication: For Hypertension    ProAir HFA 90 mcg/inh inhalation aerosol  -- 2 puff(s) inhaled 4 times a day, As Needed  -- Indication: For asthma    famotidine 20 mg oral tablet  -- 1 tab(s) by mouth every 12 hours  -- Indication: For ulcer ppx    docusate sodium 100 mg oral capsule  -- 1 cap(s) by mouth 3 times a day  -- Indication: For BM    Multiple Vitamins oral tablet  -- 1 tab(s) by mouth once a day  -- Indication: For Supplement    ascorbic acid 500 mg oral tablet  -- 1 tab(s) by mouth 2 times a day  -- Indication: For Supplement

## 2017-07-01 NOTE — DISCHARGE NOTE ADULT - CARE PLAN
Principal Discharge DX:	2-part nondisplaced fracture of surgical neck of right humerus, initial encounter for closed fracture  Instructions for follow-up, activity and diet:	non-op    DIET: resume regular diet regimen   DVT PROPHYLAXIS: ecotrin 325mg once a day x 6 weeks  WEIGHT-BEARING STATUS: - NON-weight bearing bilateral arms in slings  - NO range of motion of bilateral shoulders (range of motion at elbow/wrist/hand/fingers OK)  BATHING: keep dressing clean and dry   DRESSING CHANGES: every 2 days until dressings are dry  Secondary Diagnosis:	3-part fracture of surgical neck of left humerus, initial encounter  Instructions for follow-up, activity and diet:	open reduction internal fixation left humerus    DIET: resume regular diet regimen   DVT PROPHYLAXIS: ecotrin 325mg once a day x 6 weeks  WEIGHT-BEARING STATUS: - NON-weight bearing bilateral arms in slings  - NO range of motion of bilateral shoulders (range of motion at elbow/wrist/hand/fingers OK)  BATHING: keep dressing clean and dry   DRESSING CHANGES: every 2 days until dressings are dry Principal Discharge DX:	2-part nondisplaced fracture of surgical neck of right humerus, initial encounter for closed fracture  Goal:	pain free activities of daily living  Instructions for follow-up, activity and diet:	NON-OP    DIET: resume regular diet regimen   DVT PROPHYLAXIS: ecotrin 325mg once a day x 6 weeks  WEIGHT-BEARING STATUS: - NON-weight bearing bilateral arms in slings; both arms are OK to be taken out of slings for range of motion exercises  Right arm (NON-OP): NO shoulder range of motion; active range of motion at elbow/wrist/fingers/hand as tolerated OK  Left arm: active range of motion at shoulder/elbow/wrist/fingers/hand as tolerated OK    BATHING: keep dressing clean and dry   DRESSING CHANGES: every 2 days until dressings are dry  Secondary Diagnosis:	3-part fracture of surgical neck of left humerus, initial encounter  Goal:	pain free activities of daily living  Instructions for follow-up, activity and diet:	open reduction internal fixation left humerus    DIET: resume regular diet regimen   DVT PROPHYLAXIS: ecotrin 325mg once a day x 6 weeks  WEIGHT-BEARING STATUS: - NON-weight bearing bilateral arms in slings; both arms are OK to be taken out of slings for range of motion exercises  Right arm (NON-OP): NO shoulder range of motion; active range of motion at elbow/wrist/fingers/hand as tolerated OK  Left arm: active range of motion at shoulder/elbow/wrist/fingers/hand as tolerated OK    BATHING: keep dressing clean and dry   DRESSING CHANGES: every 2 days until dressings are dry    DIET: resume regular diet regimen   DVT PROPHYLAXIS: ecotrin 325mg once a day x 6 weeks  WEIGHT-BEARING STATUS: - NON-weight bearing bilateral arms in slings  - NO range of motion of bilateral shoulders (range of motion at elbow/wrist/hand/fingers OK)  BATHING: keep dressing clean and dry   DRESSING CHANGES: every 2 days until dressings are dry

## 2017-07-01 NOTE — PHYSICAL THERAPY INITIAL EVALUATION ADULT - PLANNED THERAPY INTERVENTIONS, PT EVAL
gait training/stair neg; Pt is clear per PT standpoint with assist from family- pt declined stair neg and didn't want to attempt bed mob at this time.

## 2017-07-01 NOTE — OCCUPATIONAL THERAPY INITIAL EVALUATION ADULT - RANGE OF MOTION EXAMINATION, UPPER EXTREMITY
Range of motion at B shoulders not tested, B elbows limited due to pain AROM, PROM WFL on R elbow, L elbow, 30* flexion, extension WFL, wrist and digits WFL

## 2017-07-01 NOTE — PROGRESS NOTE ADULT - SUBJECTIVE AND OBJECTIVE BOX
Post op Day [ 1]    Patient resting without complaints.    T(C): 36.6 (07-01-17 @ 04:37), Max: 36.9 (06-30-17 @ 13:15)  HR: 81 (07-01-17 @ 04:37) (81 - 104)  BP: 156/76 (07-01-17 @ 04:37) (103/67 - 156/83)  RR: 18 (07-01-17 @ 04:37) (18 - 18)  SpO2: 97% (07-01-17 @ 04:37) (96% - 100%)  Wt(kg): --    Exam:  Alert and Oriented,     Bilateral upper extremities:          RUE:  sling on                  neurovascucalar grossly intact          LUE:  Dressing clean/dry/intact                  hand: fingers warm and mobile                           sensation grossly intact                          9.7    11.70 )-----------( 347      ( 30 Jun 2017 00:27 )             31.5    06-30    142  |  105  |  18  ----------------------------<  107<H>  4.5   |  19<L>  |  0.67    Ca    9.5      30 Jun 2017 00:27

## 2017-07-01 NOTE — PHYSICAL THERAPY INITIAL EVALUATION ADULT - PRECAUTIONS/LIMITATIONS, REHAB EVAL
R shld xray: Impacted fracture of the surgical neck of the right humerus. L shld xray: Status post left humerus ORIF with expected postoperative change in near-anatomic alignment. fall precautions/R shld xray: Impacted fracture of the surgical neck of the right humerus. L shld xray: Status post left humerus ORIF with expected postoperative change in near-anatomic alignment.

## 2017-07-01 NOTE — DISCHARGE NOTE ADULT - PATIENT PORTAL LINK FT
“You can access the Follow[facility] Patient Portal, offered by [facility], by registering with the following website: [email or web address]”

## 2017-07-02 ENCOUNTER — TRANSCRIPTION ENCOUNTER (OUTPATIENT)
Age: 62
End: 2017-07-02

## 2017-07-02 VITALS
HEART RATE: 76 BPM | TEMPERATURE: 98 F | SYSTOLIC BLOOD PRESSURE: 154 MMHG | DIASTOLIC BLOOD PRESSURE: 91 MMHG | OXYGEN SATURATION: 99 % | RESPIRATION RATE: 18 BRPM

## 2017-07-02 LAB
ANION GAP SERPL CALC-SCNC: 13 MMOL/L — SIGNIFICANT CHANGE UP (ref 5–17)
BUN SERPL-MCNC: 11 MG/DL — SIGNIFICANT CHANGE UP (ref 7–23)
CALCIUM SERPL-MCNC: 9.3 MG/DL — SIGNIFICANT CHANGE UP (ref 8.4–10.5)
CHLORIDE SERPL-SCNC: 107 MMOL/L — SIGNIFICANT CHANGE UP (ref 96–108)
CO2 SERPL-SCNC: 23 MMOL/L — SIGNIFICANT CHANGE UP (ref 22–31)
CREAT SERPL-MCNC: 0.67 MG/DL — SIGNIFICANT CHANGE UP (ref 0.5–1.3)
GLUCOSE SERPL-MCNC: 125 MG/DL — HIGH (ref 70–99)
HCT VFR BLD CALC: 26.9 % — LOW (ref 34.5–45)
HGB BLD-MCNC: 8.8 G/DL — LOW (ref 11.5–15.5)
MCHC RBC-ENTMCNC: 29 PG — SIGNIFICANT CHANGE UP (ref 27–34)
MCHC RBC-ENTMCNC: 32.9 GM/DL — SIGNIFICANT CHANGE UP (ref 32–36)
MCV RBC AUTO: 88.3 FL — SIGNIFICANT CHANGE UP (ref 80–100)
PLATELET # BLD AUTO: 448 K/UL — HIGH (ref 150–400)
POTASSIUM SERPL-MCNC: 4.6 MMOL/L — SIGNIFICANT CHANGE UP (ref 3.5–5.3)
POTASSIUM SERPL-SCNC: 4.6 MMOL/L — SIGNIFICANT CHANGE UP (ref 3.5–5.3)
RBC # BLD: 3.05 M/UL — LOW (ref 3.8–5.2)
RBC # FLD: 12.3 % — SIGNIFICANT CHANGE UP (ref 10.3–14.5)
SODIUM SERPL-SCNC: 143 MMOL/L — SIGNIFICANT CHANGE UP (ref 135–145)
TSH SERPL-MCNC: 1.01 UIU/ML — SIGNIFICANT CHANGE UP (ref 0.27–4.2)
WBC # BLD: 9.7 K/UL — SIGNIFICANT CHANGE UP (ref 3.8–10.5)
WBC # FLD AUTO: 9.7 K/UL — SIGNIFICANT CHANGE UP (ref 3.8–10.5)

## 2017-07-02 PROCEDURE — C1889: CPT

## 2017-07-02 PROCEDURE — 86901 BLOOD TYPING SEROLOGIC RH(D): CPT

## 2017-07-02 PROCEDURE — 85027 COMPLETE CBC AUTOMATED: CPT

## 2017-07-02 PROCEDURE — 73030 X-RAY EXAM OF SHOULDER: CPT

## 2017-07-02 PROCEDURE — 93005 ELECTROCARDIOGRAM TRACING: CPT

## 2017-07-02 PROCEDURE — 86900 BLOOD TYPING SEROLOGIC ABO: CPT

## 2017-07-02 PROCEDURE — 76000 FLUOROSCOPY <1 HR PHYS/QHP: CPT

## 2017-07-02 PROCEDURE — 97161 PT EVAL LOW COMPLEX 20 MIN: CPT

## 2017-07-02 PROCEDURE — 80048 BASIC METABOLIC PNL TOTAL CA: CPT

## 2017-07-02 PROCEDURE — 97165 OT EVAL LOW COMPLEX 30 MIN: CPT

## 2017-07-02 PROCEDURE — 84443 ASSAY THYROID STIM HORMONE: CPT

## 2017-07-02 PROCEDURE — C1713: CPT

## 2017-07-02 RX ORDER — OXYCODONE HYDROCHLORIDE 5 MG/1
1 TABLET ORAL
Qty: 80 | Refills: 0 | OUTPATIENT
Start: 2017-07-02

## 2017-07-02 RX ORDER — ASPIRIN/CALCIUM CARB/MAGNESIUM 324 MG
1 TABLET ORAL
Qty: 0 | Refills: 0 | COMMUNITY

## 2017-07-02 RX ORDER — ASPIRIN/CALCIUM CARB/MAGNESIUM 324 MG
1 TABLET ORAL
Qty: 42 | Refills: 0 | OUTPATIENT
Start: 2017-07-02 | End: 2017-08-13

## 2017-07-02 RX ADMIN — Medication 100 MILLIGRAM(S): at 13:02

## 2017-07-02 RX ADMIN — Medication 100 MILLIGRAM(S): at 05:34

## 2017-07-02 RX ADMIN — Medication 650 MILLIGRAM(S): at 04:07

## 2017-07-02 RX ADMIN — Medication 650 MILLIGRAM(S): at 05:07

## 2017-07-02 RX ADMIN — HEPARIN SODIUM 5000 UNIT(S): 5000 INJECTION INTRAVENOUS; SUBCUTANEOUS at 13:03

## 2017-07-02 RX ADMIN — ESCITALOPRAM OXALATE 10 MILLIGRAM(S): 10 TABLET, FILM COATED ORAL at 13:02

## 2017-07-02 RX ADMIN — LORATADINE 10 MILLIGRAM(S): 10 TABLET ORAL at 13:02

## 2017-07-02 RX ADMIN — Medication 500 MILLIGRAM(S): at 05:34

## 2017-07-02 RX ADMIN — HEPARIN SODIUM 5000 UNIT(S): 5000 INJECTION INTRAVENOUS; SUBCUTANEOUS at 05:34

## 2017-07-02 RX ADMIN — Medication 1 TABLET(S): at 13:03

## 2017-07-02 RX ADMIN — ONDANSETRON 4 MILLIGRAM(S): 8 TABLET, FILM COATED ORAL at 04:07

## 2017-07-02 RX ADMIN — Medication 650 MILLIGRAM(S): at 09:59

## 2017-07-02 RX ADMIN — LOSARTAN POTASSIUM 50 MILLIGRAM(S): 100 TABLET, FILM COATED ORAL at 05:35

## 2017-07-02 RX ADMIN — Medication 650 MILLIGRAM(S): at 10:29

## 2017-07-02 RX ADMIN — FAMOTIDINE 20 MILLIGRAM(S): 10 INJECTION INTRAVENOUS at 05:34

## 2017-07-02 RX ADMIN — Medication 75 MILLIGRAM(S): at 13:02

## 2017-07-02 NOTE — PROGRESS NOTE ADULT - SUBJECTIVE AND OBJECTIVE BOX
Post op Day [ 2]    Patient is a 61y old  Female who presents with a chief complaint of ORIF left and right Humerus (01 Jul 2017 14:34)    Patient resting without complaints.      Vital Signs Last 24 Hrs  T(C): 36.9 (02 Jul 2017 04:08), Max: 37.7 (01 Jul 2017 21:24)  T(F): 98.5 (02 Jul 2017 04:08), Max: 99.8 (01 Jul 2017 21:24)  HR: 91 (02 Jul 2017 04:08) (86 - 102)  BP: 161/84 (02 Jul 2017 04:08) (134/80 - 161/84)  BP(mean): --  RR: 18 (02 Jul 2017 04:08) (16 - 18)  SpO2: 97% (02 Jul 2017 04:08) (95% - 97%)    Exam:  Alert and Oriented,     Bilateral upper extremities:          RUE:  sling on                  N/V grossly intact          LUE:  Dressing clean/dry/intact                  hand: fingers warm and mobile                           sensation grossly intact to light touch                          9.7    11.70 )-----------( 347      ( 30 Jun 2017 00:27 )             31.5    06-30    142  |  105  |  18  ----------------------------<  107<H>  4.5   |  19<L>  |  0.67    Ca    9.5      30 Jun 2017 00:27

## 2017-07-02 NOTE — PROGRESS NOTE ADULT - PROBLEM SELECTOR PLAN 1
- Pain control  - DVT ppx  - NWB B/L UEs/OOB; NO ROM @ shoulders  - Ori, IS  - Continue current tx     Man Quiñonez PA-C  Orthopedic Surgery  Pager: [phone]  Spectra: [phone]

## 2017-07-02 NOTE — PROGRESS NOTE ADULT - PROBLEM SELECTOR PROBLEM 1
3-part fracture of surgical neck of left humerus, initial encounter
3-part fracture of surgical neck of left humerus, initial encounter

## 2017-10-04 ENCOUNTER — APPOINTMENT (OUTPATIENT)
Dept: ENDOCRINOLOGY | Facility: CLINIC | Age: 62
End: 2017-10-04
Payer: COMMERCIAL

## 2017-10-04 VITALS
BODY MASS INDEX: 27.65 KG/M2 | HEIGHT: 65.5 IN | WEIGHT: 168 LBS | DIASTOLIC BLOOD PRESSURE: 86 MMHG | SYSTOLIC BLOOD PRESSURE: 120 MMHG | HEART RATE: 70 BPM | OXYGEN SATURATION: 98 %

## 2017-10-04 PROCEDURE — 99215 OFFICE O/P EST HI 40 MIN: CPT

## 2017-10-04 RX ORDER — MULTIVITAMIN
TABLET ORAL
Refills: 0 | Status: ACTIVE | COMMUNITY

## 2017-12-13 ENCOUNTER — APPOINTMENT (OUTPATIENT)
Dept: ENDOCRINOLOGY | Facility: CLINIC | Age: 62
End: 2017-12-13
Payer: COMMERCIAL

## 2017-12-13 ENCOUNTER — LABORATORY RESULT (OUTPATIENT)
Age: 62
End: 2017-12-13

## 2017-12-13 VITALS
WEIGHT: 172 LBS | HEART RATE: 67 BPM | BODY MASS INDEX: 28.31 KG/M2 | DIASTOLIC BLOOD PRESSURE: 70 MMHG | SYSTOLIC BLOOD PRESSURE: 110 MMHG | HEIGHT: 65.5 IN | OXYGEN SATURATION: 98 %

## 2017-12-13 PROCEDURE — 99214 OFFICE O/P EST MOD 30 MIN: CPT

## 2017-12-13 RX ORDER — FLUTICASONE PROPIONATE 50 MCG
50 SPRAY, SUSPENSION NASAL
Refills: 0 | Status: ACTIVE | COMMUNITY

## 2017-12-14 LAB
25(OH)D3 SERPL-MCNC: 47 NG/ML
ALBUMIN SERPL ELPH-MCNC: 4.6 G/DL
ALP BLD-CCNC: 88 U/L
ALT SERPL-CCNC: 18 U/L
ANION GAP SERPL CALC-SCNC: 18 MMOL/L
AST SERPL-CCNC: 24 U/L
BILIRUB SERPL-MCNC: 0.2 MG/DL
BUN SERPL-MCNC: 23 MG/DL
CALCIUM SERPL-MCNC: 9 MG/DL
CHLORIDE SERPL-SCNC: 102 MMOL/L
CO2 SERPL-SCNC: 23 MMOL/L
CREAT SERPL-MCNC: 0.83 MG/DL
GLUCOSE SERPL-MCNC: 97 MG/DL
POTASSIUM SERPL-SCNC: 4.3 MMOL/L
PROT SERPL-MCNC: 7.6 G/DL
SODIUM SERPL-SCNC: 143 MMOL/L
T3RU NFR SERPL: 1.16 INDEX
T4 SERPL-MCNC: 4.8 UG/DL
TSH SERPL-ACNC: 1.4 UIU/ML

## 2018-01-17 ENCOUNTER — RX RENEWAL (OUTPATIENT)
Age: 63
End: 2018-01-17

## 2018-03-19 ENCOUNTER — RX RENEWAL (OUTPATIENT)
Age: 63
End: 2018-03-19

## 2018-05-31 NOTE — PHYSICAL THERAPY INITIAL EVALUATION ADULT - STANDING BALANCE: STATIC
Noted in chart review patient was seen 3/20/18 and B/P elevated 164/92 and was prescribed Lisinopril 5 mg daily. B/P on 4/3/18 was 138/78.     Spoke with patient concerning coughing issue that is slowly becoming worse since beginning Lisinopril. Now noticing coughing spasms (almost makes her vomit), last night was so bad she could not sleep and kept the entire family awake. She denies symptoms of URI. No one is ill in the home. She strongly feels the Lisinopril is the cause. She would like to stop same. Is unsure if she wants to try an alternate hypertension medication. She feels her blood pressure was elevated due to recent death of her son in December.   If an alternate medication is necessary-her pharmacy is [facility].   Routed to PCP for advise as to change of medication.     good balance

## 2018-06-20 ENCOUNTER — LABORATORY RESULT (OUTPATIENT)
Age: 63
End: 2018-06-20

## 2018-06-20 ENCOUNTER — APPOINTMENT (OUTPATIENT)
Dept: ENDOCRINOLOGY | Facility: CLINIC | Age: 63
End: 2018-06-20
Payer: COMMERCIAL

## 2018-06-20 VITALS
WEIGHT: 150 LBS | HEIGHT: 65.5 IN | OXYGEN SATURATION: 98 % | SYSTOLIC BLOOD PRESSURE: 100 MMHG | BODY MASS INDEX: 24.69 KG/M2 | HEART RATE: 61 BPM | DIASTOLIC BLOOD PRESSURE: 80 MMHG

## 2018-06-20 PROCEDURE — 99214 OFFICE O/P EST MOD 30 MIN: CPT

## 2018-06-20 RX ORDER — ALENDRONATE SODIUM 70 MG/1
70 TABLET ORAL
Qty: 13 | Refills: 3 | Status: DISCONTINUED | COMMUNITY
Start: 2018-01-17 | End: 2018-06-20

## 2018-06-20 RX ORDER — RISEDRONATE SODIUM 35 MG/1
35 TABLET, DELAYED RELEASE ORAL
Qty: 3 | Refills: 3 | Status: DISCONTINUED | COMMUNITY
Start: 2017-10-04 | End: 2018-06-20

## 2018-06-20 RX ORDER — DOCUSATE SODIUM 50 MG/1
50 CAPSULE, LIQUID FILLED ORAL
Refills: 0 | Status: DISCONTINUED | COMMUNITY
End: 2018-06-20

## 2018-06-20 RX ORDER — FAMOTIDINE 40 MG/1
TABLET, FILM COATED ORAL
Refills: 0 | Status: DISCONTINUED | COMMUNITY
End: 2018-06-20

## 2018-06-21 LAB
25(OH)D3 SERPL-MCNC: 53.9 NG/ML
ALBUMIN SERPL ELPH-MCNC: 4.5 G/DL
ALP BLD-CCNC: 58 U/L
ALT SERPL-CCNC: 12 U/L
ANION GAP SERPL CALC-SCNC: 16 MMOL/L
AST SERPL-CCNC: 21 U/L
BILIRUB SERPL-MCNC: 0.3 MG/DL
BUN SERPL-MCNC: 25 MG/DL
CALCIUM SERPL-MCNC: 9.4 MG/DL
CALCIUM SERPL-MCNC: 9.4 MG/DL
CHLORIDE SERPL-SCNC: 106 MMOL/L
CO2 SERPL-SCNC: 20 MMOL/L
CREAT SERPL-MCNC: 0.85 MG/DL
GLUCOSE SERPL-MCNC: 92 MG/DL
MAGNESIUM SERPL-MCNC: 2.2 MG/DL
PARATHYROID HORMONE INTACT: 26 PG/ML
POTASSIUM SERPL-SCNC: 4.6 MMOL/L
PROT SERPL-MCNC: 7.4 G/DL
SODIUM SERPL-SCNC: 142 MMOL/L
T3RU NFR SERPL: 1.12 INDEX
T4 SERPL-MCNC: 5.4 UG/DL
TSH SERPL-ACNC: 1.44 UIU/ML

## 2018-06-22 ENCOUNTER — RX RENEWAL (OUTPATIENT)
Age: 63
End: 2018-06-22

## 2018-07-16 ENCOUNTER — APPOINTMENT (OUTPATIENT)
Dept: ULTRASOUND IMAGING | Facility: CLINIC | Age: 63
End: 2018-07-16
Payer: COMMERCIAL

## 2018-07-16 ENCOUNTER — OUTPATIENT (OUTPATIENT)
Dept: OUTPATIENT SERVICES | Facility: HOSPITAL | Age: 63
LOS: 1 days | End: 2018-07-16
Payer: COMMERCIAL

## 2018-07-16 ENCOUNTER — APPOINTMENT (OUTPATIENT)
Dept: MAMMOGRAPHY | Facility: CLINIC | Age: 63
End: 2018-07-16
Payer: COMMERCIAL

## 2018-07-16 DIAGNOSIS — Z98.890 OTHER SPECIFIED POSTPROCEDURAL STATES: Chronic | ICD-10-CM

## 2018-07-16 DIAGNOSIS — Z00.8 ENCOUNTER FOR OTHER GENERAL EXAMINATION: ICD-10-CM

## 2018-07-16 PROBLEM — E07.9 DISORDER OF THYROID, UNSPECIFIED: Chronic | Status: ACTIVE | Noted: 2017-06-29

## 2018-07-16 PROBLEM — S42.232A 3-PART FRACTURE OF SURGICAL NECK OF LEFT HUMERUS, INITIAL ENCOUNTER FOR CLOSED FRACTURE: Chronic | Status: ACTIVE | Noted: 2017-06-29

## 2018-07-16 PROBLEM — S42.224A: Chronic | Status: ACTIVE | Noted: 2017-06-29

## 2018-07-16 PROCEDURE — 76856 US EXAM PELVIC COMPLETE: CPT | Mod: 26

## 2018-07-16 PROCEDURE — 77063 BREAST TOMOSYNTHESIS BI: CPT | Mod: 26

## 2018-07-16 PROCEDURE — 76856 US EXAM PELVIC COMPLETE: CPT

## 2018-07-16 PROCEDURE — 76641 ULTRASOUND BREAST COMPLETE: CPT | Mod: 26,50

## 2018-07-16 PROCEDURE — 77063 BREAST TOMOSYNTHESIS BI: CPT

## 2018-07-16 PROCEDURE — 77067 SCR MAMMO BI INCL CAD: CPT | Mod: 26

## 2018-07-16 PROCEDURE — 77067 SCR MAMMO BI INCL CAD: CPT

## 2018-07-16 PROCEDURE — 76641 ULTRASOUND BREAST COMPLETE: CPT

## 2018-10-15 ENCOUNTER — RX RENEWAL (OUTPATIENT)
Age: 63
End: 2018-10-15

## 2018-12-07 ENCOUNTER — LABORATORY RESULT (OUTPATIENT)
Age: 63
End: 2018-12-07

## 2018-12-07 ENCOUNTER — APPOINTMENT (OUTPATIENT)
Dept: ENDOCRINOLOGY | Facility: CLINIC | Age: 63
End: 2018-12-07
Payer: COMMERCIAL

## 2018-12-07 VITALS
OXYGEN SATURATION: 98 % | SYSTOLIC BLOOD PRESSURE: 110 MMHG | DIASTOLIC BLOOD PRESSURE: 70 MMHG | HEART RATE: 63 BPM | HEIGHT: 65.5 IN | WEIGHT: 155 LBS | BODY MASS INDEX: 25.52 KG/M2

## 2018-12-07 DIAGNOSIS — E55.9 VITAMIN D DEFICIENCY, UNSPECIFIED: ICD-10-CM

## 2018-12-07 DIAGNOSIS — M81.0 AGE-RELATED OSTEOPOROSIS W/OUT CURRENT PATHOLOGICAL FRACTURE: ICD-10-CM

## 2018-12-07 DIAGNOSIS — E05.90 THYROTOXICOSIS, UNSPECIFIED W/OUT THYROTOXIC CRISIS OR STORM: ICD-10-CM

## 2018-12-07 DIAGNOSIS — E04.2 NONTOXIC MULTINODULAR GOITER: ICD-10-CM

## 2018-12-07 PROCEDURE — 99214 OFFICE O/P EST MOD 30 MIN: CPT

## 2018-12-07 RX ORDER — EPINEPHRINE 0.3 MG/.3ML
INJECTION INTRAMUSCULAR
Refills: 0 | Status: ACTIVE | COMMUNITY

## 2018-12-07 RX ORDER — MULTIVIT-MIN/FOLIC/VIT K/LYCOP 400-300MCG
1000 TABLET ORAL
Refills: 0 | Status: DISCONTINUED | COMMUNITY
End: 2018-12-07

## 2018-12-07 RX ORDER — FAMOTIDINE 20 MG/1
20 TABLET, FILM COATED ORAL
Refills: 0 | Status: ACTIVE | COMMUNITY

## 2018-12-07 RX ORDER — RIZATRIPTAN BENZOATE 10 MG/1
TABLET ORAL
Refills: 0 | Status: ACTIVE | COMMUNITY

## 2018-12-08 LAB
ALBUMIN SERPL ELPH-MCNC: 4.4 G/DL
ALP BLD-CCNC: 57 U/L
ALT SERPL-CCNC: 7 U/L
ANION GAP SERPL CALC-SCNC: 12 MMOL/L
AST SERPL-CCNC: 22 U/L
BILIRUB SERPL-MCNC: 0.2 MG/DL
BUN SERPL-MCNC: 27 MG/DL
CALCIUM SERPL-MCNC: 9.4 MG/DL
CHLORIDE SERPL-SCNC: 105 MMOL/L
CO2 SERPL-SCNC: 23 MMOL/L
CREAT SERPL-MCNC: 0.94 MG/DL
GLUCOSE SERPL-MCNC: 89 MG/DL
POTASSIUM SERPL-SCNC: 4.3 MMOL/L
PROT SERPL-MCNC: 7.7 G/DL
SODIUM SERPL-SCNC: 140 MMOL/L
T3RU NFR SERPL: 1.11 INDEX
T4 SERPL-MCNC: 6 UG/DL
TSH SERPL-ACNC: 1.89 UIU/ML

## 2018-12-11 ENCOUNTER — RX CHANGE (OUTPATIENT)
Age: 63
End: 2018-12-11

## 2018-12-11 RX ORDER — ZOLEDRONIC ACID 5 MG/100ML
5 INJECTION INTRAVENOUS
Qty: 1 | Refills: 0 | Status: ACTIVE | COMMUNITY
Start: 2018-12-11

## 2018-12-11 RX ORDER — ZOLEDRONIC ACID 5 MG/100ML
5 INJECTION INTRAVENOUS
Refills: 0 | Status: ACTIVE | COMMUNITY
Start: 2018-12-11

## 2019-08-07 ENCOUNTER — APPOINTMENT (OUTPATIENT)
Dept: ULTRASOUND IMAGING | Facility: CLINIC | Age: 64
End: 2019-08-07
Payer: COMMERCIAL

## 2019-08-07 ENCOUNTER — APPOINTMENT (OUTPATIENT)
Dept: MAMMOGRAPHY | Facility: CLINIC | Age: 64
End: 2019-08-07
Payer: COMMERCIAL

## 2019-08-07 ENCOUNTER — OUTPATIENT (OUTPATIENT)
Dept: OUTPATIENT SERVICES | Facility: HOSPITAL | Age: 64
LOS: 1 days | End: 2019-08-07
Payer: COMMERCIAL

## 2019-08-07 DIAGNOSIS — Z98.890 OTHER SPECIFIED POSTPROCEDURAL STATES: Chronic | ICD-10-CM

## 2019-08-07 DIAGNOSIS — Z00.8 ENCOUNTER FOR OTHER GENERAL EXAMINATION: ICD-10-CM

## 2019-08-07 PROCEDURE — 77067 SCR MAMMO BI INCL CAD: CPT | Mod: 26

## 2019-08-07 PROCEDURE — 76641 ULTRASOUND BREAST COMPLETE: CPT | Mod: 26,50

## 2019-08-07 PROCEDURE — 76856 US EXAM PELVIC COMPLETE: CPT | Mod: 26

## 2019-08-07 PROCEDURE — 76641 ULTRASOUND BREAST COMPLETE: CPT

## 2019-08-07 PROCEDURE — 76830 TRANSVAGINAL US NON-OB: CPT | Mod: 26

## 2019-08-07 PROCEDURE — 77063 BREAST TOMOSYNTHESIS BI: CPT

## 2019-08-07 PROCEDURE — 76856 US EXAM PELVIC COMPLETE: CPT

## 2019-08-07 PROCEDURE — 76830 TRANSVAGINAL US NON-OB: CPT

## 2019-08-07 PROCEDURE — 77067 SCR MAMMO BI INCL CAD: CPT

## 2019-08-07 PROCEDURE — 77063 BREAST TOMOSYNTHESIS BI: CPT | Mod: 26

## 2019-08-12 ENCOUNTER — APPOINTMENT (OUTPATIENT)
Dept: MAMMOGRAPHY | Facility: CLINIC | Age: 64
End: 2019-08-12
Payer: COMMERCIAL

## 2019-08-12 ENCOUNTER — APPOINTMENT (OUTPATIENT)
Dept: ULTRASOUND IMAGING | Facility: CLINIC | Age: 64
End: 2019-08-12
Payer: COMMERCIAL

## 2019-08-12 ENCOUNTER — OUTPATIENT (OUTPATIENT)
Dept: OUTPATIENT SERVICES | Facility: HOSPITAL | Age: 64
LOS: 1 days | End: 2019-08-12
Payer: COMMERCIAL

## 2019-08-12 DIAGNOSIS — Z98.890 OTHER SPECIFIED POSTPROCEDURAL STATES: Chronic | ICD-10-CM

## 2019-08-12 DIAGNOSIS — Z00.8 ENCOUNTER FOR OTHER GENERAL EXAMINATION: ICD-10-CM

## 2019-08-12 PROCEDURE — 76642 ULTRASOUND BREAST LIMITED: CPT | Mod: 26,LT

## 2019-08-12 PROCEDURE — 77065 DX MAMMO INCL CAD UNI: CPT | Mod: 26,LT

## 2019-08-12 PROCEDURE — G0279: CPT

## 2019-08-12 PROCEDURE — G0279: CPT | Mod: 26

## 2019-08-12 PROCEDURE — 76642 ULTRASOUND BREAST LIMITED: CPT

## 2019-08-12 PROCEDURE — 77065 DX MAMMO INCL CAD UNI: CPT

## 2019-10-25 ENCOUNTER — RX RENEWAL (OUTPATIENT)
Age: 64
End: 2019-10-25

## 2019-12-13 ENCOUNTER — TRANSCRIPTION ENCOUNTER (OUTPATIENT)
Age: 64
End: 2019-12-13

## 2020-02-21 ENCOUNTER — APPOINTMENT (OUTPATIENT)
Dept: ULTRASOUND IMAGING | Facility: CLINIC | Age: 65
End: 2020-02-21

## 2020-02-21 ENCOUNTER — APPOINTMENT (OUTPATIENT)
Dept: MAMMOGRAPHY | Facility: CLINIC | Age: 65
End: 2020-02-21

## 2020-02-25 ENCOUNTER — RX RENEWAL (OUTPATIENT)
Age: 65
End: 2020-02-25

## 2020-03-19 ENCOUNTER — RX RENEWAL (OUTPATIENT)
Age: 65
End: 2020-03-19

## 2020-03-19 RX ORDER — ERGOCALCIFEROL 1.25 MG/1
1.25 MG CAPSULE, LIQUID FILLED ORAL
Qty: 4 | Refills: 0 | Status: ACTIVE | COMMUNITY
Start: 2017-03-21 | End: 1900-01-01

## 2020-07-01 ENCOUNTER — RX RENEWAL (OUTPATIENT)
Age: 65
End: 2020-07-01

## 2021-07-30 NOTE — PATIENT PROFILE ADULT. - NS PRO ABUSE SCREEN AFRAID ANYONE YN
Problem: Knowledge Deficit - Standard  Goal: Patient and family/care givers will demonstrate understanding of plan of care, disease process/condition, diagnostic tests and medications  Outcome: Progressing     Problem: Pain - Standard  Goal: Alleviation of pain or a reduction in pain to the patient’s comfort goal  Outcome: Progressing     Problem: Fluid Volume  Goal: Fluid volume balance will be maintained  Outcome: Progressing     The patient is Stable - Low risk of patient condition declining or worsening    Shift Goals  Clinical Goals: pain control; BM  Patient Goals: rest    Progress made toward(s) clinical / shift goals: pain controlled, PRN pain medication ordered    Patient is not progressing towards the following goals:  BM yet, NGT still putting out contents       no

## 2021-08-20 ENCOUNTER — TRANSCRIPTION ENCOUNTER (OUTPATIENT)
Age: 66
End: 2021-08-20

## 2022-12-06 NOTE — PROVIDER CONTACT NOTE (OTHER) - ACTION/TREATMENT ORDERED:
Preventive Care Visit  [facility] SAMIRA Eastman MD, Family Medicine  Dec 6, 2022  Assessment & Plan   15 month old, here for preventive care.    (Z00.129) Encounter for routine child health examination w/o abnormal findings  (primary encounter diagnosis)  Comment: still has a rash but is eating more/better overall. [facility] needs a form order so he can get Neocate Jr formula. I said I'd send in the order, however, when I got online to print the form, I found the process had changed. I hope to figure out how to submit the order so it is available, along with age-appropriate foods.  Plan: sodium fluoride (VANISH) 5% white varnish 1         packet, MA APPLICATION TOPICAL FLUORIDE VARNISH        BY [facility], DTAP, 5 PERTUSSIS ANTIGENS         (DAPTACEL)    (L20.83) Infantile atopic dermatitis  Comment: continued problems. I refilled meds, encouraged aggressive hydration of the skin with severe cold weather approaching, etc  Plan: mometasone (ELOCON) 0.1 % external ointment,         ibuprofen (ADVIL/MOTRIN) 100 MG/5ML suspension,        acetaminophen (TYLENOL) 160 MG/5ML suspension,         triamcinolone (KENALOG) 0.1 % external ointment    (Z23) Need for immunization against influenza  Comment: given  Plan: INFLUENZA VACCINE IM > 6 MONTHS VALENT IIV4         (AFLURIA/FLUZONE)    (Z23) Immunization due  Comment: gave routine shots  Plan: HIB (PRP-T) (ActHIB), CANCELED: DTAP         IMMUNIZATION (<7Y), IM [INFANRIX]  (MNVAC)    (Z23) High priority for 2019 novel coronavirus vaccination  Comment: declined    (L23.9) Allergic dermatitis  Comment: use med prn  Plan: cetirizine (ZYRTEC) 5 MG/5ML solution,         triamcinolone (KENALOG) 0.1 % external ointment      Growth      Normal OFC, length and weight    Immunizations   Appropriate vaccinations were ordered.  I provided face to face vaccine counseling, answered questions, and explained the benefits and risks of the vaccine components ordered today  including:  Influenza - Preserve Free 6-35 months    Anticipatory Guidance    Reviewed age appropriate anticipatory guidance.   The following topics were discussed:  SOCIAL/ FAMILY:    Reading to child    Book given from Reach Out & Read program    Positive discipline    Limit TV and digital media to less than 1 hour  NUTRITION:    Healthy food choices  HEALTH/ SAFETY:    Dental hygiene    Car seat    Referrals/Ongoing Specialty Care  None  Verbal Dental Referral: Verbal dental referral was given  Dental Fluoride Varnish: Yes, fluoride varnish application risks and benefits were discussed, and verbal consent was received.    Follow Up      No follow-ups on file.          Subjective     Throws up with cows milk  Soy milk causes rash      Additional Questions 12/6/2022   Accompanied by aunt and mother   Questions for today's visit Yes   Questions facial rash   Surgery, major illness, or injury since last physical No     Social 12/6/2022   Lives with Parent(s)   Please specify: -   Who takes care of your child? Parent(s), Grandparent(s)   Recent potential stressors None   History of trauma No   Family Hx mental health challenges Unknown   Lack of transportation has limited access to appts/meds No   Difficulty paying mortgage/rent on time No   Lack of steady place to sleep/has slept in a shelter No     Health Risks/Safety 12/6/2022   What type of car seat does your child use?  Infant car seat   Is your child's car seat forward or rear facing? Rear facing   Where does your child sit in the car?  Back seat   Are stairs gated at home? -   Do you use space heaters, wood stove, or a fireplace in your home? No   Are poisons/cleaning supplies and medications kept out of reach? Yes   Do you have guns/firearms in the home? No     TB Screening 1/17/2022   Was your child born outside of the [place]? No     TB Screening: Consider immunosuppression as a risk factor for TB 12/6/2022   Recent TB infection or positive TB test in  family/close contacts No   Recent travel outside [place] (child/family/close contacts) No   Recent residence in high-risk group setting (correctional facility/health care facility/homeless shelter/refugee camp) No      Dental Screening 12/6/2022   Has your child had cavities in the last 2 years? Unknown   Have parents/caregivers/siblings had cavities in the last 2 years? (!) YES, IN THE LAST 7-23 MONTHS- MODERATE RISK     Diet 12/6/2022   Questions about feeding? No   How does your child eat?  (!) BOTTLE, Sippy cup, Spoon feeding by caregiver, Self-feeding   What does your child regularly drink? Water, (!) MILK ALTERNATIVE (EG: SOY, ALMOND, RIPPLE), (!) JUICE   What type of water? Tap, (!) BOTTLED   Vitamin or supplement use (!) OTHER   How often does your family eat meals together? (!) SOME DAYS   How many snacks does your child eat per day 1-2   Are there types of foods your child won't eat? No   In past 12 months, concerned food might run out Sometimes true   In past 12 months, food has run out/couldn't afford more Sometimes true     (!) FOOD SECURITY CONCERN PRESENT  Elimination 12/6/2022   Bowel or bladder concerns? (!) CONSTIPATION (HARD OR INFREQUENT POOP)     Media Use 12/6/2022   Hours per day of screen time (for entertainment) 0-1     Sleep 12/6/2022   Do you have any concerns about your child's sleep? No concerns, regular bedtime routine and sleeps well through the night   Please specify: -   How many times does your child wake in the night?  -     Vision/Hearing 12/6/2022   Vision or hearing concerns No concerns     Development/ Social-Emotional Screen 12/6/2022   Does your child receive any special services? (!) OCCUPATIONAL THERAPY, (!) PHYSICAL THERAPY     Development  Screening tool used, reviewed with parent/guardian: No screening tool used  Milestones (by observation/exam/report) 75-90% ile  PERSONAL/ SOCIAL/COGNITIVE:    Imitates actions    Drinks from cup    Plays ball with you  LANGUAGE:    2-4  "words besides mama/ cierra     Shakes head for \"no\"    Hands object when asked to  GROSS MOTOR:    Climbs up on chair  FINE MOTOR/ ADAPTIVE:    Scribbles    Turns pages of book     Uses spoon         Objective     Exam  Temp 98.7  F (37.1  C) (Axillary)   HC 45.3 cm (17.82\")   11 %ile (Z= -1.20) based on WHO (Boys, 0-2 years) head circumference-for-age based on Head Circumference recorded on 12/6/2022.  No weight on file for this encounter.  No height on file for this encounter.  No height and weight on file for this encounter.    Physical Exam  GENERAL: Active, alert, in no acute distress.  SKIN: Clear. No significant rash, abnormal pigmentation or lesions  HEAD: Normocephalic.  EYES:  Symmetric light reflex and no eye movement on cover/uncover test. Normal conjunctivae.  EARS: Normal canals. Tympanic membranes are normal; gray and translucent.  NOSE: Normal without discharge.  MOUTH/THROAT: Clear. No oral lesions. Teeth without obvious abnormalities.  NECK: Supple, no masses.  No thyromegaly.  LYMPH NODES: No adenopathy  LUNGS: Clear. No rales, rhonchi, wheezing or retractions  HEART: Regular rhythm. Normal S1/S2. No murmurs. Normal pulses.  ABDOMEN: Soft, non-tender, not distended, no masses or hepatosplenomegaly. Bowel sounds normal.   GENITALIA: Normal male external genitalia. Pineda stage I,  both testes descended, no hernia or hydrocele.    EXTREMITIES: Full range of motion, no deformities  NEUROLOGIC: No focal findings. Cranial nerves grossly intact: DTR's normal. Normal gait, strength and tone      Screening Questionnaire for Pediatric Immunization    1. Is the child sick today?  No  2. Does the child have allergies to medications, food, a vaccine component, or latex? No  3. Has the child had a serious reaction to a vaccine in the past? No  4. Has the child had a health problem with lung, heart, kidney or metabolic disease (e.g., diabetes), asthma, a blood disorder, no spleen, complement component " deficiency, a cochlear implant, or a spinal fluid leak?  Is he/she on long-term aspirin therapy? No  5. If the child to be vaccinated is 2 through 4 years of age, has a healthcare provider told you that the child had wheezing or asthma in the  past 12 months? No  6. If your child is a baby, have you ever been told he or she has had intussusception?  No  7. Has the child, sibling or parent had a seizure; has the child had brain or other nervous system problems?  No  8. Does the child or a family member have cancer, leukemia, HIV/AIDS, or any other immune system problem?  No  9. In the past 3 months, has the child taken medications that affect the immune system such as prednisone, other steroids, or anticancer drugs; drugs for the treatment of rheumatoid arthritis, Crohn's disease, or psoriasis; or had radiation treatments?  No  10. In the past year, has the child received a transfusion of blood or blood products, or been given immune (gamma) globulin or an antiviral drug?  No  11. Is the child/teen pregnant or is there a chance that she could become  pregnant during the next month?  No  12. Has the child received any vaccinations in the past 4 weeks?  No     Immunization questionnaire answers were all negative.    [place] given to patient.      Screening performed by Kasie Eastman MD  [facility]   Meghan SOUSA made aware, will re ordered bloods in the morning if pt is in less pain and able to move arms more. Will continue to monitor.

## 2023-07-17 NOTE — PHYSICAL THERAPY INITIAL EVALUATION ADULT - LEVEL OF INDEPENDENCE: GAIT, REHAB EVAL
oriented to person, place and time , normal sensation , short and long term memory intact
independent

## 2023-07-25 ENCOUNTER — APPOINTMENT (OUTPATIENT)
Dept: ULTRASOUND IMAGING | Facility: IMAGING CENTER | Age: 68
End: 2023-07-25
Payer: MEDICARE

## 2023-07-25 ENCOUNTER — APPOINTMENT (OUTPATIENT)
Dept: SURGERY | Facility: CLINIC | Age: 68
End: 2023-07-25
Payer: MEDICARE

## 2023-07-25 ENCOUNTER — OUTPATIENT (OUTPATIENT)
Dept: OUTPATIENT SERVICES | Facility: HOSPITAL | Age: 68
LOS: 1 days | End: 2023-07-25
Payer: MEDICARE

## 2023-07-25 VITALS
SYSTOLIC BLOOD PRESSURE: 148 MMHG | BODY MASS INDEX: 25.23 KG/M2 | HEIGHT: 66 IN | DIASTOLIC BLOOD PRESSURE: 79 MMHG | WEIGHT: 157 LBS | HEART RATE: 60 BPM

## 2023-07-25 DIAGNOSIS — E04.1 NONTOXIC SINGLE THYROID NODULE: ICD-10-CM

## 2023-07-25 DIAGNOSIS — Z98.890 OTHER SPECIFIED POSTPROCEDURAL STATES: Chronic | ICD-10-CM

## 2023-07-25 PROCEDURE — 99204 OFFICE O/P NEW MOD 45 MIN: CPT

## 2023-07-25 PROCEDURE — 76536 US EXAM OF HEAD AND NECK: CPT | Mod: 26

## 2023-07-25 PROCEDURE — 76536 US EXAM OF HEAD AND NECK: CPT

## 2023-07-25 RX ORDER — LEVOCETIRIZINE DIHYDROCHLORIDE 5 MG/1
TABLET ORAL
Refills: 0 | Status: ACTIVE | COMMUNITY

## 2023-07-25 RX ORDER — LEVOTHYROXINE SODIUM 25 UG/1
25 TABLET ORAL
Refills: 0 | Status: ACTIVE | COMMUNITY

## 2023-07-25 RX ORDER — TOPIRAMATE 50 MG/1
50 TABLET, FILM COATED ORAL
Refills: 0 | Status: ACTIVE | COMMUNITY

## 2023-07-25 RX ORDER — CHOLECALCIFEROL (VITAMIN D3) 1250 MCG
1.25 MG CAPSULE ORAL
Refills: 0 | Status: ACTIVE | COMMUNITY

## 2023-07-25 RX ORDER — ALBUTEROL SULFATE 90 UG/1
108 (90 BASE) INHALANT RESPIRATORY (INHALATION)
Refills: 0 | Status: ACTIVE | COMMUNITY

## 2023-07-25 RX ORDER — RIZATRIPTAN BENZOATE 10 MG/1
10 TABLET ORAL
Refills: 0 | Status: ACTIVE | COMMUNITY

## 2023-07-26 NOTE — REASON FOR VISIT
Patient is requesting refill of  Nitroglycerin  But states she also has two additional medications she needs refills for.States she is unsure what medications are but the pharmacy was suppose to request these refills.    [Initial Consultation] : an initial consultation for [Spouse] : spouse [FreeTextEntry2] : Thyroid nodule

## 2023-07-26 NOTE — ASSESSMENT
[FreeTextEntry1] : recent sonogram shows conflicting results.  repeat sonogram requested. to call next week for results. patient has been given the opportunity to ask questions, and all of the patient's questions have been answered to their satisfaction\par

## 2023-07-26 NOTE — HISTORY OF PRESENT ILLNESS
[de-identified] : Pt c/o thyroid nodules.  denies dysphagia, hoarseness, SOB or RT exposure.  prior treatment with [facility] for hyperthyroidism\par sonogram:  Right 1.1 cm, 6 mm and 8 mm, Left 4 mm and 6 mm,  isthmus 7 mm thyroid nodules\par biopsy (2013)  benign\par Normal TFTs, calcium 9.0, vitamin D 49\par I have reviewed all old and new data and available images. Additional information was obtained from others present at the time of visit to ensure the completeness of the history\par

## 2023-07-26 NOTE — CONSULT LETTER
[Dear  ___] : Dear  [unfilled], [Consult Letter:] : I had the pleasure of evaluating your patient, [unfilled]. [Please see my note below.] : Please see my note below. [Consult Closing:] : Thank you very much for allowing me to participate in the care of this patient.  If you have any questions, please do not hesitate to contact me. [Sincerely,] : Sincerely, [FreeTextEntry2] : Dr. Miki Burnett [FreeTextEntry3] : Tobias Fink MD, FACS\par System Director, Endocrine Surgery\par [facility]\par Associate  Professor of Surgery\par [facility] [employer] [facility]

## 2023-07-26 NOTE — PHYSICAL EXAM
[de-identified] : 1  cm right thyroid nodule, well circumscribed and mobile [Laryngoscopy Performed] : laryngoscopy was performed, see procedure section for findings [Midline] : located in midline position [Normal] : orientation to person, place, and time: normal [de-identified] : indirect  laryngoscopy shows normal vocal cord mobility bilaterally with no lesions noted

## 2023-07-27 ENCOUNTER — NON-APPOINTMENT (OUTPATIENT)
Age: 68
End: 2023-07-27

## 2023-07-27 DIAGNOSIS — E04.1 NONTOXIC SINGLE THYROID NODULE: ICD-10-CM

## 2024-07-19 ENCOUNTER — APPOINTMENT (OUTPATIENT)
Dept: ULTRASOUND IMAGING | Facility: IMAGING CENTER | Age: 69
End: 2024-07-19

## 2024-07-23 ENCOUNTER — APPOINTMENT (OUTPATIENT)
Dept: SURGERY | Facility: CLINIC | Age: 69
End: 2024-07-23

## 2024-11-08 ENCOUNTER — APPOINTMENT (OUTPATIENT)
Dept: ULTRASOUND IMAGING | Facility: IMAGING CENTER | Age: 69
End: 2024-11-08
Payer: MEDICARE

## 2024-11-08 ENCOUNTER — OUTPATIENT (OUTPATIENT)
Dept: OUTPATIENT SERVICES | Facility: HOSPITAL | Age: 69
LOS: 1 days | End: 2024-11-08
Payer: MEDICARE

## 2024-11-08 DIAGNOSIS — Z98.890 OTHER SPECIFIED POSTPROCEDURAL STATES: Chronic | ICD-10-CM

## 2024-11-08 DIAGNOSIS — E04.1 NONTOXIC SINGLE THYROID NODULE: ICD-10-CM

## 2024-11-08 PROCEDURE — 76536 US EXAM OF HEAD AND NECK: CPT | Mod: 26

## 2024-11-20 PROCEDURE — 76536 US EXAM OF HEAD AND NECK: CPT

## 2024-12-03 ENCOUNTER — APPOINTMENT (OUTPATIENT)
Dept: SURGERY | Facility: CLINIC | Age: 69
End: 2024-12-03

## 2025-01-07 ENCOUNTER — APPOINTMENT (OUTPATIENT)
Dept: SURGERY | Facility: CLINIC | Age: 70
End: 2025-01-07
Payer: MEDICARE

## 2025-01-07 DIAGNOSIS — E04.1 NONTOXIC SINGLE THYROID NODULE: ICD-10-CM

## 2025-01-07 PROCEDURE — 99213 OFFICE O/P EST LOW 20 MIN: CPT
